# Patient Record
(demographics unavailable — no encounter records)

---

## 2025-04-26 NOTE — HISTORY OF PRESENT ILLNESS
[FreeTextEntry1] : This patient is a 39 year old female who previously was a longstanding patient of mine. She has not been seen for more than 3 years and returns to reestablish care. She presents with her . She reports menses are q 28 days with LMP 4/12/25. She is not using any contraception but is accepting of pregnancy. All of the hormonal and nonhormonal reversible methods of contraception were discussed with the patient at length. This included the risks, benefits, and side effects of each. Patient had an opportunity to have all of her questions answered to her apparent satisfaction.

## 2025-04-26 NOTE — PLAN
[FreeTextEntry1] : pap, hpv today pt given referral for mammogram pt to call with decision about contraception she will take prenatal vitamins if no contraception.

## 2025-04-26 NOTE — ASSESSMENT
[TextEntry] : pleasant 39 year old female with normal gyn exam. She is undecided about whether she would like to actively pursue another pregnancy or if her family is complete.

## 2025-04-26 NOTE — PHYSICAL EXAM
[Appropriately responsive] : appropriately responsive [Alert] : alert [No Acute Distress] : no acute distress [No Lymphadenopathy] : no lymphadenopathy [Regular Rate Rhythm] : regular rate rhythm [No Murmurs] : no murmurs [Clear to Auscultation B/L] : clear to auscultation bilaterally [Soft] : soft [Non-tender] : non-tender [Non-distended] : non-distended [No HSM] : No HSM [No Lesions] : no lesions [No Mass] : no mass [Oriented x3] : oriented x3 [Examination Of The Breasts] : a normal appearance [No Discharge] : no discharge [No Masses] : no breast masses were palpable [Labia Majora] : normal [Labia Minora] : normal [Normal] : normal [Uterine Adnexae] : normal [FreeTextEntry5] : pap, hpv obtained